# Patient Record
(demographics unavailable — no encounter records)

---

## 2024-10-30 NOTE — DISCUSSION/SUMMARY
[FreeTextEntry1] : Counseling for all components of the vaccines given today (see orders below) discussed with patient and patients parent/legal guardian.  All questions answered constipation discussed reassured re MiraLAX, can also add kids Culturelle with fiber, discussed fruits re pears, prunes, peaches, papaya, soy milk or ripple

## 2024-10-30 NOTE — PHYSICAL EXAM
[TextEntry] :  Gen: Awake, alert,  In no acute distress , well appearing Eyes: no periorbital swelling Ears :   right Tympanic Membrane:  Normal               left tympanic Membrane:  cerumen able to see tm normal Pharynx: no redness ,no sores, not inflamed  Neck supple Cardiac : normal rate, regular rhythm, S1,S2 normal, no murmur Lungs: clear to auscultation

## 2024-10-30 NOTE — HISTORY OF PRESENT ILLNESS
[de-identified] : 2nd flu vaccine. Parents want ears checked, pt. sticking finger in right ear.  [FreeTextEntry6] : VENKAT is here today for follow up for flu vaccine #2 , sticking finger in right ear sticking finger 2 nights ago teething doing well feeding well history of constipation evaluated in office needed glycerin suppository with transition to whole milk recommended back to formula until bm normal then to try soy milk, Miralax using  here and there  fier culture p fruits, ok

## 2025-01-20 NOTE — DISCUSSION/SUMMARY
[Normal Growth] : growth [Normal Development] : development [No Elimination Concerns] : elimination [No Feeding Concerns] : feeding [Normal Sleep Pattern] : sleep [No Medications] : ~He/She~ is not on any medications [Mother] : mother [Father] : father [FreeTextEntry9] : guidance handout given to parent [] : The components of the vaccine(s) to be administered today are listed in the plan of care. The disease(s) for which the vaccine(s) are intended to prevent and the risks have been discussed with the caretaker.  The risks are also included in the appropriate vaccination information statements which have been provided to the patient's caregiver.  The caregiver has given consent to vaccinate. [FreeTextEntry1] : Continue whole cow's milk. Continue table foods, 3 meals with 2-3 snacks per day. Incorporate water daily in a sippy cup. Brush teeth twice a day with soft toothbrush. Recommend visit to dentist. When in car, keep child in rear-facing car seats until age 2, or until  the maximum height and weight for seat is reached. Put baby to sleep in own crib. Lower crib matress. Help baby to maintain consistent daily routines and sleep schedule. Recognize stranger and separation anxiety. Ensure home is safe since baby is increasingly mobile. Be within arm's reach of baby at all times. Use consistent, positive discipline. Read aloud to baby.  Return in 3 mo for 18 mo well child check.

## 2025-01-20 NOTE — PHYSICAL EXAM
[Alert] : alert [No Acute Distress] : no acute distress [Normocephalic] : normocephalic [Anterior T[place]ckow Closed] : anterior fontanelle closed [Red Reflex Bilateral] : red reflex bilateral [PERRL] : PERRL [Normally Placed Ears] : normally placed ears [Auricles Well Formed] : auricles well formed [Clear Tympanic membranes with present light reflex and bony landmarks] : clear tympanic membranes with present light reflex and bony landmarks [No Discharge] : no discharge [Nares Patent] : nares patent [Palate Intact] : palate intact [Uvula Midline] : uvula midline [Tooth Eruption] : tooth eruption  [Supple, full passive range of motion] : supple, full passive range of motion [No Palpable Masses] : no palpable masses [Symmetric Chest Rise] : symmetric chest rise [Clear to Auscultation Bilaterally] : clear to auscultation bilaterally [Regular Rate and Rhythm] : regular rate and rhythm [S1, S2 present] : S1, S2 present [No Murmurs] : no murmurs [Soft] : soft [NonTender] : non tender [Non Distended] : non distended [No Hepatomegaly] : no hepatomegaly [No Splenomegaly] : no splenomegaly [Car 1] : Car 1 [No Clitoromegaly] : no clitoromegaly [Normal Vaginal Introitus] : normal vaginal introitus [Patent] : patent [Normally Placed] : normally placed [No Abnormal Lymph Nodes Palpated] : no abnormal lymph nodes palpated [No Clavicular Crepitus] : no clavicular crepitus [Negative Drake-Ortalani] : negative Drake-Ortalani [Symmetric Buttocks Creases] : symmetric buttocks creases [No Spinal Dimple] : no spinal dimple [NoTuft of Hair] : no tuft of hair [Cranial Nerves Grossly Intact] : cranial nerves grossly intact [No Rash or Lesions] : no rash or lesions

## 2025-01-20 NOTE — HISTORY OF PRESENT ILLNESS
[Normal] : Normal [Brushing teeth] : Brushing teeth [Up to date] : Up to date [FreeTextEntry7] : 15 month WCC [de-identified] : eating very well balanced foods  [FreeTextEntry8] : has been better- after starting milk constipation occurred- used mirlalax and soy milk

## 2025-01-25 NOTE — HISTORY OF PRESENT ILLNESS
[de-identified] : Possible lumps, rash since Tuesday per dad after vaccines on Mondays. No fever or spreading  [FreeTextEntry6] : c/o bumps on skin- had vaccines 3days ago- 15month vaccines; not itchy or painful- eating/drinking well, normal voiding, no fevers

## 2025-01-25 NOTE — PHYSICAL EXAM
[NL] : regular rate and rhythm, normal S1, S2 audible, no murmurs [de-identified] : + 2papules to anterior right lower leg- pink blanching

## 2025-01-25 NOTE — DISCUSSION/SUMMARY
[FreeTextEntry1] : D/W parent papules- unrelated to vaccines, advise bacitracin to area, supportive care, monitor and call with concerns.  time spent: 20min

## 2025-03-18 NOTE — PHYSICAL EXAM

## 2025-03-18 NOTE — HISTORY OF PRESENT ILLNESS
[Mother] : mother [Normal] : Normal [Brushing teeth] : Brushing teeth [No] : No cigarette smoke exposure [Water heater temperature set at <120 degrees F] : Water heater temperature set at <120 degrees F [Car seat in back seat] : Car seat in back seat [Carbon Monoxide Detectors] : Carbon monoxide detectors [Smoke Detectors] : Smoke detectors [FreeTextEntry7] : 18 mo c [de-identified] : patient has a good variety of foods and fluids /water  and milk ( reintroducing)  [FreeTextEntry8] : still doing better

## 2025-03-18 NOTE — DISCUSSION/SUMMARY
[Normal Growth] : growth [Normal Development] : development [No Elimination Concerns] : elimination [No Feeding Concerns] : feeding [Normal Sleep Pattern] : sleep [No Medications] : ~He/She~ is not on any medications [Mother] : mother [FreeTextEntry9] : guidance handout given to parent [] : The components of the vaccine(s) to be administered today are listed in the plan of care. The disease(s) for which the vaccine(s) are intended to prevent and the risks have been discussed with the caretaker.  The risks are also included in the appropriate vaccination information statements which have been provided to the patient's caregiver.  The caregiver has given consent to vaccinate. [FreeTextEntry1] : Continue whole cow's milk. Continue table foods, 3 meals with 2-3 snacks per day. Incorporate water daily in a sippy cup. Brush teeth twice a day with soft toothbrush. Recommend visit to dentist. When in car, keep child in rear-facing car seats until age 2, or until  the maximum height and weight for seat is reached. Put todder to sleep in own bed or crib. Help toddler to maintain consistent daily routines and sleep schedule. Toilet training discussed. Recognize anxiety in new settings. Ensure home is safe. Be within arm's reach of toddler at all times. Use consistent, positive discipline. Read aloud to toddler.

## 2025-04-25 NOTE — DISCUSSION/SUMMARY
[FreeTextEntry1] : D/W caregiver croup etiology and disease course; reviewed supportive care including antipyretics, nasal saline and fluid intake, cold air for barky cough- proceed to ED if stridor or respiratory distress occur, steroid ordered as below.  Answered patient questions about COVID-19 including signs and symptoms, self home care and proper isolation precautions. Parent/patient declined COVID 19 testing today. time spent: 35min

## 2025-05-13 NOTE — PHYSICAL EXAM
[NL] : moves all extremities x4, warm, well perfused x4 [de-identified] : scattered erythematous maculopapular rash on neck and torso

## 2025-05-13 NOTE — HISTORY OF PRESENT ILLNESS
[de-identified] : Mom reports increased irritability x a couple of days. Decreased appetite and new rash x this morning. Rash to back of neck, behind ears, abdomen and around diaper area. Afebrile at home with voiding at baseline. Denies respiratory distress.  [FreeTextEntry6] : slightly itchy no fever no cough, no congestion no vomiting, no diarrhea decreased appetite

## 2025-07-03 NOTE — DISCUSSION/SUMMARY
[FreeTextEntry1] : - Symptomatic treatment - Cool moist air /Humidifier - Saline drops/suction - Return PRN new or worsening symptoms

## 2025-07-03 NOTE — HISTORY OF PRESENT ILLNESS
[de-identified] : mom states congestion and cough no fever x1 day  [FreeTextEntry6] : Symptoms started yesterday Cough and congestion Parents both with URIs No fever OK PO, UOP and sleep